# Patient Record
Sex: MALE | Race: WHITE | Employment: FULL TIME | URBAN - METROPOLITAN AREA
[De-identification: names, ages, dates, MRNs, and addresses within clinical notes are randomized per-mention and may not be internally consistent; named-entity substitution may affect disease eponyms.]

---

## 2017-07-05 ENCOUNTER — OFFICE VISIT (OUTPATIENT)
Dept: SURGERY | Facility: CLINIC | Age: 32
End: 2017-07-05

## 2017-07-05 VITALS
SYSTOLIC BLOOD PRESSURE: 105 MMHG | HEART RATE: 56 BPM | WEIGHT: 153.63 LBS | HEIGHT: 70 IN | TEMPERATURE: 97 F | RESPIRATION RATE: 12 BRPM | DIASTOLIC BLOOD PRESSURE: 67 MMHG | BODY MASS INDEX: 22 KG/M2

## 2017-07-05 DIAGNOSIS — K62.89 ANAL PAIN: Primary | ICD-10-CM

## 2017-07-05 PROCEDURE — 46600 DIAGNOSTIC ANOSCOPY SPX: CPT | Performed by: SURGERY

## 2017-07-05 PROCEDURE — 99243 OFF/OP CNSLTJ NEW/EST LOW 30: CPT | Performed by: SURGERY

## 2017-07-05 RX ORDER — ALUMINUM CHLORIDE 20 %
SOLUTION, NON-ORAL TOPICAL
COMMUNITY
Start: 2017-05-08 | End: 2017-07-05

## 2017-07-05 RX ORDER — CLINDAMYCIN PHOSPHATE AND BENZOYL PEROXIDE 10; 50 MG/G; MG/G
GEL TOPICAL
COMMUNITY
Start: 2017-05-11 | End: 2017-07-05

## 2017-07-05 RX ORDER — CIPROFLOXACIN 500 MG/1
TABLET, FILM COATED ORAL
COMMUNITY
Start: 2017-06-24 | End: 2017-07-05

## 2017-07-05 RX ORDER — XERAC AC 0.06 G/G
LIQUID TOPICAL
Refills: 11 | COMMUNITY
Start: 2017-05-11 | End: 2017-07-05

## 2017-07-05 RX ORDER — ESCITALOPRAM OXALATE 10 MG/1
TABLET ORAL
COMMUNITY
Start: 2017-05-05 | End: 2017-07-05

## 2017-07-05 NOTE — H&P
New Patient Visit Note       Active Problems      1. Anal pain        Chief Complaint   Patient presents with:  Anal / Rectal Problem: NP c/o anal skin tag, has some itching. Does not have pain- more bothersome.  Saw Dr. Rubina Torres in 2014 for internal/ e by me today. History reviewed. No pertinent family history.   Social History    Marital status: Single              Spouse name:                       Years of education:                 Number of children:               Social History Main Topics    Smo Abscess  No Fistula in ano  No Anterior Fissure  No Posterior Fissure  No Pilonidal Cyst    See Procedures:  Anoscopy reveals no abnormalities of the anal rectal canal within the view of the anoscope. Musculoskeletal: Normal range of motion.    Neurologic

## 2018-02-12 PROBLEM — Z13.1 SCREENING FOR DIABETES MELLITUS: Status: ACTIVE | Noted: 2018-02-12

## 2018-02-12 PROBLEM — G47.00 INSOMNIA, UNSPECIFIED TYPE: Status: ACTIVE | Noted: 2018-02-12

## 2018-02-12 PROBLEM — Z13.220 SCREENING CHOLESTEROL LEVEL: Status: ACTIVE | Noted: 2018-02-12

## 2018-02-12 PROBLEM — Z00.00 PREVENTATIVE HEALTH CARE: Status: ACTIVE | Noted: 2018-02-12

## 2018-02-12 PROBLEM — K64.9 HEMORRHOIDS, UNSPECIFIED HEMORRHOID TYPE: Status: ACTIVE | Noted: 2018-02-12

## 2018-02-12 PROBLEM — F41.9 ANXIETY: Status: ACTIVE | Noted: 2018-02-12

## 2018-02-20 ENCOUNTER — OFFICE VISIT (OUTPATIENT)
Dept: SURGERY | Facility: CLINIC | Age: 33
End: 2018-02-20

## 2018-02-20 VITALS
WEIGHT: 154 LBS | BODY MASS INDEX: 22.05 KG/M2 | TEMPERATURE: 98 F | DIASTOLIC BLOOD PRESSURE: 85 MMHG | HEART RATE: 52 BPM | HEIGHT: 70 IN | SYSTOLIC BLOOD PRESSURE: 137 MMHG

## 2018-02-20 DIAGNOSIS — K64.8 INTERNAL HEMORRHOIDS: Primary | ICD-10-CM

## 2018-02-20 DIAGNOSIS — K62.89 ANAL PAIN: ICD-10-CM

## 2018-02-20 DIAGNOSIS — Z98.890 HISTORY OF LUMBOSACRAL SPINE SURGERY: ICD-10-CM

## 2018-02-20 PROCEDURE — 46600 DIAGNOSTIC ANOSCOPY SPX: CPT | Performed by: COLON & RECTAL SURGERY

## 2018-02-20 PROCEDURE — 99243 OFF/OP CNSLTJ NEW/EST LOW 30: CPT | Performed by: COLON & RECTAL SURGERY

## 2018-02-20 NOTE — PATIENT INSTRUCTIONS
This patient presents for evaluation and treatment of anal pain. The patient was last seen in 2014 when he underwent excisional hemorrhoidectomy in August.  He did have a prolonged recovery period. It took him approximately 90 days to be symptom-free. alert, no significant distress. His lungs are clear to auscultation bilaterally. His heart rate and rhythm are regular. His abdomen is soft, nontender, nondistended, with normoactive bowel sounds.   External examination of the perineum reveals 2 slash in

## 2018-02-20 NOTE — H&P
New Patient Visit Note       Active Problems      1. Internal hemorrhoids    2. Anal pain    3.  History of lumbosacral spine surgery - pudendal nerve decompression and transposition        Chief Complaint   Anal pain      History of Present Illness   This past history consists of a pudendal nerve decompression and transposition. This was due to severe coccygeal pain that he experienced after a fall which was refractory to medical management.   The patient denies any pain or complications related to this ope sister or daughter with cancer of the uterus? no   Do you have any grandparents, grandchildren, aunts, nieces, or first cousins with cancer of the uterus? no     Are you taking aspirin? no   Do you take any blood thinners?  no     Are you taking prednisone Outpatient Prescriptions:  Hydrocortisone Acetate 25 MG Rectal Suppos Place 1 suppository (25 mg total) rectally 2 (two) times daily.  Disp: 24 suppository Rfl: 5   diazepam 5 MG Oral Tab Take 1 tablet (5 mg total) by mouth every 12 (twelve) hours as needed and normal heart sounds. No murmur heard. Pulmonary/Chest: No accessory muscle usage. No tachypnea. No respiratory distress. He has no decreased breath sounds. He has no wheezes. He has no rhonchi. He has no rales. Abdominal: Soft.  Normal appearance cervical and no posterior cervical adenopathy present. Right: No inguinal and no supraclavicular adenopathy present. Left: No inguinal and no supraclavicular adenopathy present.    Neurological: He is alert and oriented to person, place, and t a Hydrocort suppository prescription. He states that this has provided minimal aid. He is also currently performing sitz baths. The patient has never had a colonoscopy. Physical exam:  The patient is awake, alert, no significant distress.   His lungs

## 2018-02-22 ENCOUNTER — OFFICE VISIT (OUTPATIENT)
Dept: SURGERY | Facility: CLINIC | Age: 33
End: 2018-02-22

## 2018-02-22 VITALS
BODY MASS INDEX: 22.05 KG/M2 | HEART RATE: 65 BPM | SYSTOLIC BLOOD PRESSURE: 132 MMHG | DIASTOLIC BLOOD PRESSURE: 76 MMHG | HEIGHT: 70 IN | WEIGHT: 154 LBS

## 2018-02-22 DIAGNOSIS — K64.2 PROLAPSED INTERNAL HEMORRHOIDS, GRADE 3: Primary | ICD-10-CM

## 2018-02-22 DIAGNOSIS — K62.89 ANAL PAIN: ICD-10-CM

## 2018-02-22 PROCEDURE — 46221 LIGATION OF HEMORRHOID(S): CPT | Performed by: COLON & RECTAL SURGERY

## 2018-02-22 NOTE — PROGRESS NOTES
Follow Up Visit Note       Active Problems      1. Prolapsed internal hemorrhoids, grade 3    2.  Anal pain          Chief Complaint   Patient presents with:  Anal / Rectal Problem: 1ST BANDING        History of Present Illness        Allergies  Aren Olmstead has N weight change. HENT: Negative for hearing loss, nosebleeds, sore throat and trouble swallowing. Respiratory: Negative for apnea, cough, shortness of breath and wheezing. Cardiovascular: Negative for chest pain, palpitations and leg swelling.    Pricila

## 2018-02-23 PROBLEM — K64.2 PROLAPSED INTERNAL HEMORRHOIDS, GRADE 3: Status: ACTIVE | Noted: 2018-02-12

## 2018-02-23 NOTE — PATIENT INSTRUCTIONS
At today's office visit we treated a right anterolateral internal hemorrhoid. At today's visit, the patient underwent an uncomplicated application of a rubber band and injection of a 5% phenol solution into the base of the rubberbanded hemorrhoid.     Sravan Ferreira

## 2018-02-23 NOTE — PROCEDURES
Pre op diagnosis: Internal Hemorrhoids    Post op diagnosis: Same    Procedure: Anoscopy with O-ring Rubber Band Ligation of Internal Hemorrhoids    Surgeon: Dillon Ledesma MD    History of present illness:    This patient has internal hemorrhoids that are s

## 2018-03-08 ENCOUNTER — OFFICE VISIT (OUTPATIENT)
Dept: SURGERY | Facility: CLINIC | Age: 33
End: 2018-03-08

## 2018-03-08 VITALS
BODY MASS INDEX: 22.81 KG/M2 | TEMPERATURE: 98 F | WEIGHT: 154 LBS | HEIGHT: 69 IN | HEART RATE: 50 BPM | DIASTOLIC BLOOD PRESSURE: 81 MMHG | SYSTOLIC BLOOD PRESSURE: 127 MMHG

## 2018-03-08 DIAGNOSIS — K64.2 PROLAPSED INTERNAL HEMORRHOIDS, GRADE 3: Primary | ICD-10-CM

## 2018-03-08 PROCEDURE — 46221 LIGATION OF HEMORRHOID(S): CPT | Performed by: COLON & RECTAL SURGERY

## 2018-03-08 NOTE — PROGRESS NOTES
Follow Up Visit Note       Active Problems      1. Prolapsed internal hemorrhoids, grade 3          Chief Complaint   Patient presents with:  Anal / Rectal Problem: 2ND BANDING. HAD A LITTLE BLOOD THE DAY AFTER AND A LITTLE BLOOD LAST NIGHT.          Histor chills, diaphoresis, fatigue, fever and unexpected weight change. HENT: Negative for hearing loss, nosebleeds, sore throat and trouble swallowing. Respiratory: Negative for apnea, cough, shortness of breath and wheezing.     Cardiovascular: Negative fo

## 2018-03-14 ENCOUNTER — TELEPHONE (OUTPATIENT)
Dept: SURGERY | Facility: CLINIC | Age: 33
End: 2018-03-14

## 2018-03-14 NOTE — TELEPHONE ENCOUNTER
Patient called report that since his banding done on 3/8/2018 he has had increased pain and discomfort 5/10 to the site of the first banding site, patient denies discharge, bleeding or fever.  He has a 3 rd banding scheduled for 3/22/2018 and will call the

## 2018-03-15 NOTE — PROCEDURES
Pre op diagnosis: Internal Hemorrhoids    Post op diagnosis: Same    Procedure: Anoscopy with O-ring Rubber Band Ligation of Internal Hemorrhoids    Surgeon: Marlene Márquez MD    History of present illness:    This patient has internal hemorrhoids that are s

## 2018-03-22 ENCOUNTER — OFFICE VISIT (OUTPATIENT)
Dept: SURGERY | Facility: CLINIC | Age: 33
End: 2018-03-22

## 2018-03-22 VITALS
SYSTOLIC BLOOD PRESSURE: 116 MMHG | TEMPERATURE: 99 F | DIASTOLIC BLOOD PRESSURE: 68 MMHG | HEIGHT: 70 IN | BODY MASS INDEX: 20.76 KG/M2 | WEIGHT: 145 LBS | HEART RATE: 52 BPM

## 2018-03-22 DIAGNOSIS — K64.2 PROLAPSED INTERNAL HEMORRHOIDS, GRADE 3: Primary | ICD-10-CM

## 2018-03-22 PROCEDURE — 46221 LIGATION OF HEMORRHOID(S): CPT | Performed by: COLON & RECTAL SURGERY

## 2018-03-22 NOTE — PATIENT INSTRUCTIONS
At today's office visit we treated a right lateral internal hemorrhoid. At today's visit, the patient underwent an uncomplicated application of a rubber band and injection of a 5% phenol solution into the base of the rubberbanded hemorrhoid.     The maddie

## 2018-03-22 NOTE — PROGRESS NOTES
Follow Up Visit Note       Active Problems      1. Prolapsed internal hemorrhoids, grade 3          Chief Complaint   Patient presents with:  Hemorrhoids: 3rd hemorrhoid banding. C/O anal pain/soreness.          History of Present Illness        Allergies change. HENT: Negative for hearing loss, nosebleeds, sore throat and trouble swallowing. Respiratory: Negative for apnea, cough, shortness of breath and wheezing. Cardiovascular: Negative for chest pain, palpitations and leg swelling.    Gastrointes

## 2018-03-22 NOTE — PROCEDURES
Pre op diagnosis: Internal Hemorrhoids    Post op diagnosis: Same    Procedure: Anoscopy with O-ring Rubber Band Ligation of Internal Hemorrhoids    Surgeon: Christine Sauceda MD    History of present illness:    This patient has internal hemorrhoids that are s

## 2018-04-17 ENCOUNTER — OFFICE VISIT (OUTPATIENT)
Dept: SURGERY | Facility: CLINIC | Age: 33
End: 2018-04-17

## 2018-04-17 VITALS
DIASTOLIC BLOOD PRESSURE: 73 MMHG | SYSTOLIC BLOOD PRESSURE: 112 MMHG | HEIGHT: 70 IN | TEMPERATURE: 98 F | WEIGHT: 145 LBS | HEART RATE: 60 BPM | BODY MASS INDEX: 20.76 KG/M2

## 2018-04-17 DIAGNOSIS — K62.89 ANAL PAIN: Primary | ICD-10-CM

## 2018-04-17 PROCEDURE — 46600 DIAGNOSTIC ANOSCOPY SPX: CPT | Performed by: COLON & RECTAL SURGERY

## 2018-04-18 NOTE — PROCEDURES
Procedure:  Anoscopy    Surgeon: Jimmie Beth    Anesthesia: None    Findings: See the progress note attached for all findings    Operative Summary: The patient was placed in a prone position on the proctoscopy table, the hips were flexed in the jackknife p

## 2018-04-18 NOTE — PROGRESS NOTES
Follow Up Visit Note       Active Problems      1. Anal pain          Chief Complaint   Patient presents with:  Anal / Rectal Problem: 4TH BANDING. PT HAD A LITTLE BIT OF BLEEDING A FEW DAYS AGO WITH BOWEL MOVEMENTS.          History of Present Illness fatigue, fever and unexpected weight change. HENT: Negative for hearing loss, nosebleeds, sore throat and trouble swallowing. Respiratory: Negative for apnea, cough, shortness of breath and wheezing.     Cardiovascular: Negative for chest pain, palpita MD

## 2018-04-18 NOTE — PATIENT INSTRUCTIONS
This patient has completed 3 rubber band treatments for recurrent internal hemorrhoids, grade 3. I am happy to report that all of his symptoms have completely resolved. Anoscopy was performed today.   This reveals no evidence of recurrent or residual

## 2018-06-15 PROBLEM — R36.1 BLOOD IN SEMEN: Status: RESOLVED | Noted: 2017-08-18 | Resolved: 2018-06-15

## 2018-06-15 PROBLEM — R05.9 COUGH: Status: ACTIVE | Noted: 2017-03-07

## 2018-06-15 PROBLEM — G43.909 MIGRAINES: Status: ACTIVE | Noted: 2018-05-18

## 2018-06-15 PROBLEM — L50.9 URTICARIA: Status: RESOLVED | Noted: 2017-04-17 | Resolved: 2018-06-15

## 2018-06-15 PROBLEM — R36.1 BLOOD IN SEMEN: Status: ACTIVE | Noted: 2017-08-18

## 2018-06-15 PROBLEM — Z71.84 TRAVEL ADVICE ENCOUNTER: Status: RESOLVED | Noted: 2018-03-12 | Resolved: 2018-06-15

## 2018-06-15 PROBLEM — S22.39XA RIB FRACTURE: Status: ACTIVE | Noted: 2017-02-27

## 2018-06-15 PROBLEM — G47.00 INSOMNIA: Status: ACTIVE | Noted: 2017-01-14

## 2018-06-15 PROBLEM — S22.39XA RIB FRACTURE: Status: RESOLVED | Noted: 2017-02-27 | Resolved: 2018-06-15

## 2018-06-15 PROBLEM — R05.9 COUGH: Status: RESOLVED | Noted: 2017-03-07 | Resolved: 2018-06-15

## 2018-06-15 PROBLEM — L50.9 URTICARIA: Status: ACTIVE | Noted: 2017-04-17

## 2018-06-15 PROBLEM — Z71.84 TRAVEL ADVICE ENCOUNTER: Status: ACTIVE | Noted: 2018-03-12

## 2018-06-15 PROCEDURE — 81003 URINALYSIS AUTO W/O SCOPE: CPT | Performed by: INTERNAL MEDICINE

## 2018-08-06 ENCOUNTER — OFFICE VISIT (OUTPATIENT)
Dept: SURGERY | Facility: CLINIC | Age: 33
End: 2018-08-06
Payer: COMMERCIAL

## 2018-08-06 VITALS
BODY MASS INDEX: 20.76 KG/M2 | HEART RATE: 64 BPM | HEIGHT: 70 IN | SYSTOLIC BLOOD PRESSURE: 132 MMHG | WEIGHT: 145 LBS | DIASTOLIC BLOOD PRESSURE: 77 MMHG

## 2018-08-06 DIAGNOSIS — K64.2 PROLAPSED INTERNAL HEMORRHOIDS, GRADE 3: Primary | ICD-10-CM

## 2018-08-06 PROCEDURE — 46221 LIGATION OF HEMORRHOID(S): CPT | Performed by: COLON & RECTAL SURGERY

## 2018-08-06 NOTE — PATIENT INSTRUCTIONS
This patient presents with what he feels is a recurrence of his internal hemorrhoids. He was recently [place] in [place] [place], he felt some pain and swelling in the anus.     Clinical exam today reveals a right posterior lateral internal hemorrhoid at th

## 2018-08-06 NOTE — PROGRESS NOTES
Follow Up Visit Note       Active Problems      1. Prolapsed internal hemorrhoids, grade 3          Chief Complaint   Patient presents with:  Hemorrhoids: Continued care of hemorrhoids c/o rectal pain right side and rectal bleeding.  Hx of hemorrhoid bandin Acetaminophen 500 MG Oral Cap Take 1 capsule by mouth every 6 (six) hours as needed for Fever. Disp: 100 capsule Rfl: 0        Review of Systems  The Review of Systems has been reviewed by me during today.   Review of Systems   Constitutional: Negative fo asked to recover in my office for a few minutes prior to leaving for home. The patient should refrain from extreme sports or athletic activity, including heavy lifting. I have no further follow-up scheduled with this patient at this time.   This patie

## 2018-08-06 NOTE — PROCEDURES
Pre op diagnosis: Internal Hemorrhoids    Post op diagnosis: Same    Procedure: Anoscopy with O-ring Rubber Band Ligation of Internal Hemorrhoids    Surgeon: Sherrie Baker MD    History of present illness:    This patient has internal hemorrhoids that are s

## 2018-08-23 ENCOUNTER — OFFICE VISIT (OUTPATIENT)
Dept: SURGERY | Facility: CLINIC | Age: 33
End: 2018-08-23
Payer: COMMERCIAL

## 2018-08-23 VITALS
SYSTOLIC BLOOD PRESSURE: 115 MMHG | HEIGHT: 70 IN | RESPIRATION RATE: 20 BRPM | WEIGHT: 145 LBS | HEART RATE: 82 BPM | BODY MASS INDEX: 20.76 KG/M2 | DIASTOLIC BLOOD PRESSURE: 78 MMHG

## 2018-08-23 DIAGNOSIS — K64.2 PROLAPSED INTERNAL HEMORRHOIDS, GRADE 3: Primary | ICD-10-CM

## 2018-08-23 PROCEDURE — 46221 LIGATION OF HEMORRHOID(S): CPT | Performed by: COLON & RECTAL SURGERY

## 2018-08-23 NOTE — PROGRESS NOTES
Follow Up Visit Note       Active Problems      1.  Prolapsed internal hemorrhoids, grade 3          Chief Complaint   Patient presents with:  Hemorrhoids: established patient, possible banding of hemorrhoid, was banded 2 weeks ago wants to insure prpperla trevizo daily. Disp:  Rfl:         Review of Systems  The Review of Systems has been reviewed by me during today. Review of Systems   Constitutional: Negative for chills, diaphoresis, fatigue, fever and unexpected weight change.    HENT: Negative for hearing loss, urgently for any problems or complications related to my surgical intervention. No orders of the defined types were placed in this encounter. Imaging & Referrals   None    Follow Up  Return if symptoms worsen or fail to improve.     Ada Mckee

## 2018-08-23 NOTE — PROCEDURES
Pre op diagnosis: Internal Hemorrhoids    Post op diagnosis: Same    Procedure: Anoscopy with O-ring Rubber Band Ligation of Internal Hemorrhoids    Surgeon: Kassie Graham MD    History of present illness:    This patient has internal hemorrhoids that are s

## 2018-10-18 ENCOUNTER — OFFICE VISIT (OUTPATIENT)
Dept: SURGERY | Facility: CLINIC | Age: 33
End: 2018-10-18
Payer: COMMERCIAL

## 2018-10-18 VITALS
SYSTOLIC BLOOD PRESSURE: 145 MMHG | DIASTOLIC BLOOD PRESSURE: 82 MMHG | WEIGHT: 145 LBS | HEART RATE: 61 BPM | HEIGHT: 70 IN | BODY MASS INDEX: 20.76 KG/M2

## 2018-10-18 DIAGNOSIS — Z98.890 HISTORY OF LUMBOSACRAL SPINE SURGERY: ICD-10-CM

## 2018-10-18 DIAGNOSIS — K62.89 ANAL PAIN: Primary | ICD-10-CM

## 2018-10-18 PROCEDURE — 46600 DIAGNOSTIC ANOSCOPY SPX: CPT | Performed by: COLON & RECTAL SURGERY

## 2018-10-18 PROCEDURE — 99213 OFFICE O/P EST LOW 20 MIN: CPT | Performed by: COLON & RECTAL SURGERY

## 2018-10-18 NOTE — PROGRESS NOTES
Follow Up Visit Note       Active Problems      1. Anal pain    2. History of lumbosacral spine surgery - pudendal nerve decompression and transposition          Chief Complaint   Patient presents with:  Anal / Rectal Problem: EST PT - HEMORRHOIDS.  PT STAT Not on file      Number of children: Not on file      Years of education: Not on file      Highest education level: Not on file    Social Needs      Financial resource strain: Not on file      Food insecurity - worry: Not on file      Food insecurity - daksha diarrhea, nausea and vomiting. Genitourinary: Negative for difficulty urinating, dysuria, frequency and urgency. Musculoskeletal: Negative for arthralgias and myalgias. Skin: Negative for color change and rash.    Neurological: Negative for tremors, s within the last few days since he made this appointment. He has required multiple rubber band treatments to the redundant anal mucosa and is grade 3 prolapsing internal hemorrhoids.     Clinical exam today including a anoscopy reveals him to have no curr

## 2018-10-20 PROBLEM — G47.00 INSOMNIA: Status: RESOLVED | Noted: 2017-01-14 | Resolved: 2018-10-20

## 2018-10-20 PROBLEM — K64.2 PROLAPSED INTERNAL HEMORRHOIDS, GRADE 3: Status: RESOLVED | Noted: 2018-02-12 | Resolved: 2018-10-20

## 2018-10-20 PROBLEM — Z13.1 SCREENING FOR DIABETES MELLITUS: Status: RESOLVED | Noted: 2018-02-12 | Resolved: 2018-10-20

## 2018-10-20 PROBLEM — Z13.220 SCREENING CHOLESTEROL LEVEL: Status: RESOLVED | Noted: 2018-02-12 | Resolved: 2018-10-20

## 2018-10-20 PROBLEM — Z00.00 PREVENTATIVE HEALTH CARE: Status: RESOLVED | Noted: 2018-02-12 | Resolved: 2018-10-20

## 2018-10-20 NOTE — PATIENT INSTRUCTIONS
I am seeing this patient for anal pain and burning. He has had several procedures on the sacrum, and anus. We have been rubber banding hemorrhoids. The patient states that he had initial very good relief with the hemorrhoid rubber banding.   Unfortun

## 2018-10-20 NOTE — PROCEDURES
Procedure:  Anoscopy    Surgeon: Army Paez    Anesthesia: None    Findings: See the progress note attached for all findings    Operative Summary: The patient was placed in a prone position on the proctoscopy table, the hips were flexed in the jackknife p

## 2018-12-14 PROBLEM — G43.109 MIGRAINE WITH AURA AND WITHOUT STATUS MIGRAINOSUS, NOT INTRACTABLE: Status: ACTIVE | Noted: 2018-12-14

## 2018-12-14 PROBLEM — R45.86 EMOTIONAL LABILITY: Status: ACTIVE | Noted: 2018-12-14

## 2018-12-14 PROBLEM — N50.819 TESTICULAR DISCOMFORT: Status: ACTIVE | Noted: 2018-08-29

## 2018-12-14 PROBLEM — F81.9 LEARNING DIFFICULTY: Status: ACTIVE | Noted: 2018-09-25

## 2019-02-22 PROBLEM — L55.0 SUNBURN OF FIRST DEGREE: Status: ACTIVE | Noted: 2019-02-22

## 2019-03-21 ENCOUNTER — OFFICE VISIT (OUTPATIENT)
Dept: SURGERY | Facility: CLINIC | Age: 34
End: 2019-03-21

## 2019-03-21 VITALS
DIASTOLIC BLOOD PRESSURE: 80 MMHG | SYSTOLIC BLOOD PRESSURE: 130 MMHG | BODY MASS INDEX: 22 KG/M2 | WEIGHT: 154 LBS | TEMPERATURE: 98 F | HEART RATE: 66 BPM

## 2019-03-21 DIAGNOSIS — K62.89 ANAL PAIN: Primary | ICD-10-CM

## 2019-03-21 DIAGNOSIS — K64.2 GRADE III HEMORRHOIDS: ICD-10-CM

## 2019-03-21 PROCEDURE — 99213 OFFICE O/P EST LOW 20 MIN: CPT | Performed by: COLON & RECTAL SURGERY

## 2019-03-21 PROCEDURE — 46600 DIAGNOSTIC ANOSCOPY SPX: CPT | Performed by: COLON & RECTAL SURGERY

## 2019-03-21 NOTE — PATIENT INSTRUCTIONS
This patient presents for evaluation and treatment of anal burning and discomfort. The patient was recently seen 1 year ago and found to have grade 3 internal hemorrhoids. He underwent rubber band treatments with good relief.   He did have a mild flare contact our office with any questions or concerns.

## 2019-03-21 NOTE — PROGRESS NOTES
Follow Up Visit Note       Active Problems      1. Anal pain    2.  Grade III hemorrhoids          Chief Complaint   Patient presents with:  Hemorrhoids: pt states had 4 bandings done last yr with Dr. Maximilian Martini Problem (GI): pt c/o of itchiness/burning f listed diagnoses and treatment options. Allergies  Nayana Franks has No Known Allergies. Past Medical / Surgical / Social / Family History    The past medical and past surgical history have been reviewed by me today.     Past Medical History:   Diagnosis diaphoresis, fatigue, fever and unexpected weight change. HENT: Negative for hearing loss, nosebleeds, sore throat and trouble swallowing. Respiratory: Negative for apnea, cough, shortness of breath and wheezing.     Cardiovascular: Negative for chest Prostate Nodule  Anal Sphincter Intact  No Pruritis Ani  No Lichenification  No Abscess  No Fistula in ano  No Anterior Fissure  No Posterior Fissure  No Pilonidal Cyst     Neurological: He is alert and oriented to person, place, and time.    Skin: Skin is which would benefit from a rubber band at this time. 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 I have encouraged him to obtain Anusol hydrocortisone suppositories which should be available over-the-counter.   The patient may use

## 2019-10-10 ENCOUNTER — OFFICE VISIT (OUTPATIENT)
Dept: SURGERY | Facility: CLINIC | Age: 34
End: 2019-10-10
Payer: COMMERCIAL

## 2019-10-10 VITALS
HEART RATE: 92 BPM | DIASTOLIC BLOOD PRESSURE: 72 MMHG | BODY MASS INDEX: 20.76 KG/M2 | SYSTOLIC BLOOD PRESSURE: 114 MMHG | HEIGHT: 70 IN | TEMPERATURE: 98 F | WEIGHT: 145 LBS

## 2019-10-10 DIAGNOSIS — K92.2 INTESTINAL BLEEDING: Primary | ICD-10-CM

## 2019-10-10 DIAGNOSIS — K62.89 ANAL PAIN: ICD-10-CM

## 2019-10-10 DIAGNOSIS — K64.2 GRADE III HEMORRHOIDS: ICD-10-CM

## 2019-10-10 PROCEDURE — 46221 LIGATION OF HEMORRHOID(S): CPT | Performed by: COLON & RECTAL SURGERY

## 2019-10-10 PROCEDURE — 99213 OFFICE O/P EST LOW 20 MIN: CPT | Performed by: COLON & RECTAL SURGERY

## 2019-10-10 NOTE — PROGRESS NOTES
Follow Up Visit Note       Active Problems      1. Intestinal bleeding    2. Grade III hemorrhoids    3.  Anal pain          Chief Complaint   Patient presents with:  Anal Problem (GI): est patient rectal pain when sitting, bleeding with BM x 3 weeks, rash Highest education level: Not on file    Tobacco Use      Smoking status: Never Smoker      Smokeless tobacco: Never Used    Substance and Sexual Activity      Alcohol use: Yes        Comment: 2-3x/month      Drug use: No       Current Outpatient Medication No rectal mass, anal fissure, tenderness, external hemorrhoid or abnormal anal tone. Prostate is not enlarged and not tender.     Genitourinary Comments: No Prostate Nodule  Anal Sphincter Intact  No Pruritis Ani  No Lichenification  No Abscess  No Fist fissures or fistulae. There is no abscess present. Prostate is normal.  The mucosal surfaces are smooth, however he does have a right lateral large internal hemorrhoid grade 3.   I took the opportunity today to rubber band and treat this hemorrhoid includ

## 2019-10-13 PROBLEM — K92.2 INTESTINAL BLEEDING: Status: ACTIVE | Noted: 2019-10-13

## 2019-10-13 NOTE — PATIENT INSTRUCTIONS
This patient presents for further consultation regarding anal pain and bleeding. We have rubberbanded several internal hemorrhoids, grade 3. The patient states that on September 2, 2019, he was seen for some perianal pain and itching.   He was started

## 2019-11-19 ENCOUNTER — OFFICE VISIT (OUTPATIENT)
Dept: SURGERY | Facility: CLINIC | Age: 34
End: 2019-11-19
Payer: COMMERCIAL

## 2019-11-19 VITALS
DIASTOLIC BLOOD PRESSURE: 76 MMHG | WEIGHT: 150 LBS | RESPIRATION RATE: 18 BRPM | BODY MASS INDEX: 21.47 KG/M2 | HEIGHT: 70 IN | TEMPERATURE: 98 F | HEART RATE: 80 BPM | SYSTOLIC BLOOD PRESSURE: 116 MMHG

## 2019-11-19 DIAGNOSIS — K92.2 INTESTINAL BLEEDING: ICD-10-CM

## 2019-11-19 DIAGNOSIS — K62.89 ANAL PAIN: Primary | ICD-10-CM

## 2019-11-19 DIAGNOSIS — K64.2 GRADE III HEMORRHOIDS: ICD-10-CM

## 2019-11-19 PROCEDURE — 99212 OFFICE O/P EST SF 10 MIN: CPT | Performed by: COLON & RECTAL SURGERY

## 2019-11-19 PROCEDURE — 46221 LIGATION OF HEMORRHOID(S): CPT | Performed by: COLON & RECTAL SURGERY

## 2019-11-19 NOTE — PATIENT INSTRUCTIONS
I am seeing this patient in further consultation regarding anal pain, prolapse, and bleeding. This patient had excisional hemorrhoidectomy in 2014. He has subsequently had multiple rubber band treatments to the region.   He also has had simple injection

## 2019-11-19 NOTE — PROCEDURES
Pre op diagnosis: Internal Hemorrhoids    Post op diagnosis: Same    Procedure: Anoscopy with O-ring Rubber Band Ligation of Internal Hemorrhoids    Surgeon: Giselle Villegas MD    History of present illness:    This patient has internal hemorrhoids that are s

## 2019-11-19 NOTE — PROGRESS NOTES
Follow Up Visit Note       Active Problems      1. Anal pain    2. Intestinal bleeding    3.  Grade III hemorrhoids          Chief Complaint   Patient presents with:  Hemorrhoids: est pt-cont care rectal bleeding-possible 2nd banding        History of Prese Mother      Social History    Socioeconomic History      Marital status: Single      Spouse name: Not on file      Number of children: Not on file      Years of education: Not on file      Highest education level: Not on file    Tobacco Use      Smoking st bruise/bleed easily. Psychiatric/Behavioral: Negative for behavioral problems and sleep disturbance.         Physical Findings   /76 (BP Location: Right arm, Patient Position: Sitting, Cuff Size: adult)   Pulse 80   Temp 97.7 °F (36.5 °C)   Resp 18 bleeding in the posterior midline region. He states he has been seeing less blood since we have performed further rubber band treatments.     Clinical exam including anoscopy reveals a posterior midline prolapsing hemorrhoid that is originating above pre

## 2020-01-09 ENCOUNTER — OFFICE VISIT (OUTPATIENT)
Dept: SURGERY | Facility: CLINIC | Age: 35
End: 2020-01-09
Payer: COMMERCIAL

## 2020-01-09 VITALS — SYSTOLIC BLOOD PRESSURE: 126 MMHG | DIASTOLIC BLOOD PRESSURE: 84 MMHG | HEART RATE: 67 BPM | TEMPERATURE: 98 F

## 2020-01-09 DIAGNOSIS — K62.89 ANAL PAIN: ICD-10-CM

## 2020-01-09 DIAGNOSIS — K64.5 THROMBOSED EXTERNAL HEMORRHOIDS: Primary | ICD-10-CM

## 2020-01-09 PROBLEM — R05.9 COUGH: Status: ACTIVE | Noted: 2020-01-09

## 2020-01-09 PROBLEM — J01.00 ACUTE NON-RECURRENT MAXILLARY SINUSITIS: Status: ACTIVE | Noted: 2020-01-09

## 2020-01-09 PROBLEM — N50.819 TESTICULAR DISCOMFORT: Status: RESOLVED | Noted: 2018-08-29 | Resolved: 2020-01-09

## 2020-01-09 PROCEDURE — 99213 OFFICE O/P EST LOW 20 MIN: CPT | Performed by: COLON & RECTAL SURGERY

## 2020-01-09 PROCEDURE — 46600 DIAGNOSTIC ANOSCOPY SPX: CPT | Performed by: COLON & RECTAL SURGERY

## 2020-01-09 NOTE — PROGRESS NOTES
Follow Up Visit Note       Active Problems      1. Thrombosed external hemorrhoids    2. Anal pain          Chief Complaint   Patient presents with:  Hemorrhoids: Est Pt; Bleeding with BM, started a few days ago.  Reports feeling an external hemorrhoid, cau was 30 minutes. Greater than half of our visit was spent in counseling the patient on the above listed diagnoses and treatment options. Allergies  Ariel Thomas has No Known Allergies.     Past Medical / Surgical / Social / Family History    The past medical 1, then 1 tab on days 2.,3,4,5, Disp: 6 tablet, Rfl: 0  •  dextromethorphan-guaiFENesin  MG/5ML Oral Liquid, Take 5 mL by mouth every 12 (twelve) hours. , Disp: 237 mL, Rfl: 0  •  Sertraline HCl 50 MG Oral Tab, Take 3 tablets (150 mg total) by mouth d tachypnea. No respiratory distress. He has no decreased breath sounds. He has no wheezes. He has no rhonchi. He has no rales. Abdominal: Soft.  Normal appearance and bowel sounds are normal. He exhibits no distension, no fluid wave, no ascites, no pulsati new hemorrhoid. The patient has underwent an excisional hemorrhoidectomy by myself in 2014. He has had multiple rubber band treatments for internal hemorrhoids the most recent treatment in November 2019.     The patient states that approximately 1 month None    Follow Up  Return if symptoms worsen or fail to improve.     Bonny Pena MD

## 2020-01-09 NOTE — PATIENT INSTRUCTIONS
The patient is well-known to myself with a history of hemorrhoids, and presents today feeling that he has a new hemorrhoid. The patient has underwent an excisional hemorrhoidectomy by myself in 2014.   He has had multiple rubber band treatments for inter for evaluation.

## 2020-04-01 PROBLEM — Z13.228 SCREENING FOR METABOLIC DISORDER: Status: ACTIVE | Noted: 2020-04-01

## 2020-04-01 PROBLEM — R05.9 COUGH: Status: RESOLVED | Noted: 2020-01-09 | Resolved: 2020-04-01

## 2020-04-01 PROBLEM — Z00.00 ANNUAL PHYSICAL EXAM: Status: ACTIVE | Noted: 2020-04-01

## 2020-04-01 PROBLEM — L55.0 SUNBURN OF FIRST DEGREE: Status: RESOLVED | Noted: 2019-02-22 | Resolved: 2020-04-01

## 2020-04-01 PROBLEM — J01.00 ACUTE NON-RECURRENT MAXILLARY SINUSITIS: Status: RESOLVED | Noted: 2020-01-09 | Resolved: 2020-04-01

## 2020-06-16 PROBLEM — Z11.9 SCREENING EXAMINATION FOR INFECTIOUS DISEASE: Status: ACTIVE | Noted: 2020-06-16

## 2020-08-11 ENCOUNTER — OFFICE VISIT (OUTPATIENT)
Dept: SURGERY | Facility: CLINIC | Age: 35
End: 2020-08-11
Payer: COMMERCIAL

## 2020-08-11 VITALS — TEMPERATURE: 98 F | HEART RATE: 64 BPM | SYSTOLIC BLOOD PRESSURE: 132 MMHG | DIASTOLIC BLOOD PRESSURE: 82 MMHG

## 2020-08-11 DIAGNOSIS — K64.2 GRADE III HEMORRHOIDS: ICD-10-CM

## 2020-08-11 DIAGNOSIS — K62.89 ANAL PAIN: Primary | ICD-10-CM

## 2020-08-11 PROBLEM — K64.5 THROMBOSED EXTERNAL HEMORRHOIDS: Status: RESOLVED | Noted: 2020-01-09 | Resolved: 2020-08-11

## 2020-08-11 PROCEDURE — 3079F DIAST BP 80-89 MM HG: CPT | Performed by: COLON & RECTAL SURGERY

## 2020-08-11 PROCEDURE — 46600 DIAGNOSTIC ANOSCOPY SPX: CPT | Performed by: COLON & RECTAL SURGERY

## 2020-08-11 PROCEDURE — 99214 OFFICE O/P EST MOD 30 MIN: CPT | Performed by: COLON & RECTAL SURGERY

## 2020-08-11 PROCEDURE — 3075F SYST BP GE 130 - 139MM HG: CPT | Performed by: COLON & RECTAL SURGERY

## 2020-08-11 NOTE — PATIENT INSTRUCTIONS
I am seeing this patient in semiurgent consultation regarding acute onset of anal pain. The patient has had a long history of internal hemorrhoids. He has also had perineal pain treated with nerve blocks and nerve stimulation.     He has had surgery on

## 2020-08-11 NOTE — PROGRESS NOTES
Follow Up Visit Note       Active Problems      1. Anal pain    2. Grade III hemorrhoids          Chief Complaint   Patient presents with:  Hemorrhoids: Est patient here for a Hemorrhoid check, believes his external hemorrhoids are back.  Has pain with sitt today.     Family History   Problem Relation Age of Onset   • Hypertension Mother    • Lipids Mother      Social History    Socioeconomic History      Marital status: Single      Spouse name: Not on file      Number of children: Not on file      Years of ed constipation, diarrhea, nausea and vomiting. Genitourinary: Negative for difficulty urinating, dysuria, frequency and urgency. Musculoskeletal: Negative for arthralgias and myalgias. Skin: Negative for color change and rash.    Neurological: Negative long history of internal hemorrhoids. He has also had perineal pain treated with nerve blocks and nerve stimulation.     He has had surgery on the sacral nerves to get rid of anal pain in the past.  I have treated him with rubber band therapy to his hemorr

## 2020-08-13 ENCOUNTER — OFFICE VISIT (OUTPATIENT)
Dept: SURGERY | Facility: CLINIC | Age: 35
End: 2020-08-13
Payer: COMMERCIAL

## 2020-08-13 VITALS
DIASTOLIC BLOOD PRESSURE: 82 MMHG | BODY MASS INDEX: 21.47 KG/M2 | HEART RATE: 64 BPM | TEMPERATURE: 98 F | HEIGHT: 70 IN | WEIGHT: 150 LBS | SYSTOLIC BLOOD PRESSURE: 132 MMHG

## 2020-08-13 DIAGNOSIS — K64.2 GRADE III HEMORRHOIDS: Primary | ICD-10-CM

## 2020-08-13 PROCEDURE — 3075F SYST BP GE 130 - 139MM HG: CPT | Performed by: COLON & RECTAL SURGERY

## 2020-08-13 PROCEDURE — 46221 LIGATION OF HEMORRHOID(S): CPT | Performed by: COLON & RECTAL SURGERY

## 2020-08-13 PROCEDURE — 3008F BODY MASS INDEX DOCD: CPT | Performed by: COLON & RECTAL SURGERY

## 2020-08-13 PROCEDURE — 3079F DIAST BP 80-89 MM HG: CPT | Performed by: COLON & RECTAL SURGERY

## 2020-08-13 RX ORDER — DIAZEPAM 5 MG/1
TABLET ORAL
COMMUNITY
Start: 2020-07-28 | End: 2020-08-28

## 2020-08-13 NOTE — PROGRESS NOTES
Follow Up Visit Note       Active Problems      1. Grade III hemorrhoids          Chief Complaint   Patient presents with:  Hemorrhoid Banding: EST PT - 1st Banding - Pt. states after Tuesday he seems to feel a bit better.  Pt. has been using his rectal sup needed for Sleep or Anxiety. , Disp: 90 tablet, Rfl: 2  •  Zolpidem Tartrate 10 MG Oral Tab, Take 0.5 tablets (5 mg total) by mouth nightly as needed for Sleep., Disp: 30 tablet, Rfl: 2  •  ergocalciferol 1.25 MG (47272 UT) Oral Cap, Take 1 capsule (50,000 underwent an uncomplicated application of a rubber band and injection of a 5% phenol solution into the base of the rubberbanded hemorrhoid. The patient tolerated the procedure well.   The patient remained stable throughout the entire procedure within my

## 2020-08-13 NOTE — PROCEDURES
Pre op diagnosis: Internal Hemorrhoids    Post op diagnosis: Same    Procedure: Anoscopy with O-ring Rubber Band Ligation of Internal Hemorrhoids    Surgeon: Bonny Pena MD    History of present illness:    This patient has internal hemorrhoids that are s

## 2020-08-31 ENCOUNTER — OFFICE VISIT (OUTPATIENT)
Dept: SURGERY | Facility: CLINIC | Age: 35
End: 2020-08-31
Payer: COMMERCIAL

## 2020-08-31 VITALS
HEART RATE: 60 BPM | SYSTOLIC BLOOD PRESSURE: 127 MMHG | TEMPERATURE: 98 F | RESPIRATION RATE: 16 BRPM | DIASTOLIC BLOOD PRESSURE: 89 MMHG | WEIGHT: 162 LBS | BODY MASS INDEX: 23 KG/M2

## 2020-08-31 DIAGNOSIS — K64.2 GRADE III HEMORRHOIDS: ICD-10-CM

## 2020-08-31 DIAGNOSIS — K62.89 ANAL PAIN: ICD-10-CM

## 2020-08-31 DIAGNOSIS — R10.2 PERINEAL PAIN IN MALE: Primary | ICD-10-CM

## 2020-08-31 PROCEDURE — 3079F DIAST BP 80-89 MM HG: CPT | Performed by: PHYSICIAN ASSISTANT

## 2020-08-31 PROCEDURE — 99213 OFFICE O/P EST LOW 20 MIN: CPT | Performed by: PHYSICIAN ASSISTANT

## 2020-08-31 PROCEDURE — 3074F SYST BP LT 130 MM HG: CPT | Performed by: PHYSICIAN ASSISTANT

## 2020-08-31 NOTE — PROGRESS NOTES
Follow Up Visit Note       Active Problems      1. Perineal pain in male    2. Grade III hemorrhoids    3. Anal pain          Chief Complaint   Patient presents with:  Rectal Pain: est pt. Pt c/o perianal redness and pain.  Pt states 'went to a walk in clin Date   • HEMORRHOIDECTOMY Right 8/15/2014    Performed by Nellie Pelletier MD at [facility] MAIN OR   • TRANSECT PUDENDAL (PELVIC) NERVE Bilateral April 2008    Release of bilateral pudendal nerve       The family history and social history have been reviewed by me swallowing. Respiratory: Negative for apnea, cough, shortness of breath and wheezing. Cardiovascular: Negative for chest pain, palpitations and leg swelling.    Gastrointestinal: Negative for abdominal distention, abdominal pain, anal bleeding, blood Genitourinary:    Rectum normal.   Rectum:      No anal fissure or external hemorrhoid.       Genitourinary Comments: Clinical examination of the perineum reveals there to be slight erythema of the perineum, however there is no active inflammation, the sk

## 2020-08-31 NOTE — PATIENT INSTRUCTIONS
The patient presents today with a new onset perineal pain approximately 2 weeks ago. The patient is well-known to our services for a history of internal hemorrhoids and intestinal bleeding. He had a rubber band placed on August 13, 2020 by Dr. Mickael Schwab. from a trauma to the area. He should continue to monitor the area, however there does not appear to be any infection at this time. If he does develop any new or worsening symptoms he should contact our office.   Otherwise, I will see the patient on an as-

## 2021-01-19 PROBLEM — R79.89 LOW VITAMIN D LEVEL: Status: ACTIVE | Noted: 2021-01-19

## 2021-11-23 ENCOUNTER — OFFICE VISIT (OUTPATIENT)
Dept: SURGERY | Facility: CLINIC | Age: 36
End: 2021-11-23
Payer: COMMERCIAL

## 2021-11-23 VITALS — TEMPERATURE: 98 F | HEART RATE: 69 BPM | SYSTOLIC BLOOD PRESSURE: 135 MMHG | DIASTOLIC BLOOD PRESSURE: 87 MMHG

## 2021-11-23 DIAGNOSIS — K62.89 ANAL PAIN: ICD-10-CM

## 2021-11-23 DIAGNOSIS — K64.2 PROLAPSED INTERNAL HEMORRHOIDS, GRADE 3: Primary | ICD-10-CM

## 2021-11-23 PROCEDURE — 3075F SYST BP GE 130 - 139MM HG: CPT | Performed by: COLON & RECTAL SURGERY

## 2021-11-23 PROCEDURE — 3079F DIAST BP 80-89 MM HG: CPT | Performed by: COLON & RECTAL SURGERY

## 2021-11-23 PROCEDURE — 46221 LIGATION OF HEMORRHOID(S): CPT | Performed by: COLON & RECTAL SURGERY

## 2021-11-23 PROCEDURE — 99213 OFFICE O/P EST LOW 20 MIN: CPT | Performed by: COLON & RECTAL SURGERY

## 2021-11-23 NOTE — PROGRESS NOTES
Follow Up Visit Note       Active Problems      1. Prolapsed internal hemorrhoids, grade 3    2. Anal pain          Chief Complaint   Patient presents with:  Hemorrhoids: 1 YEAR CONTINUED CARE FOR HEMORRHOIDS.  SEEN BLOOD INS TOOL MONTH AGO, C/O RECTAL PAIN this note as detailed above    Zach Tyson MD FACS FAS[facility]    My total time spent with this patient on today's visit was 20 minutes.   This includes time in preparation, obtaining and reviewing history, performing the examination, counseling and educati mg total) by mouth daily. , Disp: 60 tablet, Rfl: 5  •  SUMAtriptan Succinate 100 MG Oral Tab, Take 1 tablet (100 mg total) by mouth daily. , Disp: 9 tablet, Rfl: 5  •  Vardenafil HCl 20 MG Oral Tab, TK 1 T PO D 1 HOUR BEFORE NEEDED, Disp: 6 tablet, Rfl: 5 banded at today's visit. This at the site with the patient was feeling his symptoms. No evidence of any proctitis active Crohn's disease. Neurological:      Mental Status: He is alert.           Assessment   Prolapsed internal hemorrhoids, grade 3  (prim internal hemorrhoid. At today's visit, the patient underwent an uncomplicated application of a rubber band and injection of a 5% phenol solution into the base of the rubberbanded hemorrhoid. The patient tolerated the procedure well.   The patient yoselin

## 2021-11-23 NOTE — PATIENT INSTRUCTIONS
The patient presents today for continued care and evaluation of his hemorrhoids. This patient is well-known to myself after undergoing an excisional hemorrhoidectomy in 2014.   He has had multiple rubber band treatments and injections into his hemorrhoid within my office. The patient was asked to recover in my office for a few minutes prior to leaving for home. The patient should refrain from extreme sports or athletic activity, including heavy lifting.     I will see the patient on an as-needed basis

## 2021-11-23 NOTE — PROCEDURES
Pre op diagnosis: Internal Hemorrhoids    Post op diagnosis: Same    Procedure: Anoscopy with O-ring Rubber Band Ligation of Internal Hemorrhoids    Surgeon: Delfino Pena MD    History of present illness:    This patient has internal hemorrhoids that are s

## 2022-04-07 ENCOUNTER — OFFICE VISIT (OUTPATIENT)
Dept: SURGERY | Facility: CLINIC | Age: 37
End: 2022-04-07
Payer: COMMERCIAL

## 2022-04-07 VITALS
DIASTOLIC BLOOD PRESSURE: 92 MMHG | WEIGHT: 160 LBS | BODY MASS INDEX: 22.9 KG/M2 | HEIGHT: 70 IN | TEMPERATURE: 98 F | HEART RATE: 74 BPM | SYSTOLIC BLOOD PRESSURE: 133 MMHG

## 2022-04-07 DIAGNOSIS — K64.2 GRADE III HEMORRHOIDS: Primary | ICD-10-CM

## 2022-04-07 PROCEDURE — 46221 LIGATION OF HEMORRHOID(S): CPT | Performed by: COLON & RECTAL SURGERY

## 2022-04-07 PROCEDURE — 3075F SYST BP GE 130 - 139MM HG: CPT | Performed by: COLON & RECTAL SURGERY

## 2022-04-07 PROCEDURE — 3008F BODY MASS INDEX DOCD: CPT | Performed by: COLON & RECTAL SURGERY

## 2022-04-07 PROCEDURE — 3080F DIAST BP >= 90 MM HG: CPT | Performed by: COLON & RECTAL SURGERY

## 2022-04-07 NOTE — PATIENT INSTRUCTIONS
This patient presents for further care and treatment of his internal hemorrhoids. He is a . He flew to [place] and stayed overnight. He had episodes of diarrhea. This left him with pressure on the right side of the anus. He does not describe any prolapse or thrombosis. He states he does have some pink-tinged mucus drainage with wiping. He has no fever or chills. Has no abdominal pain. He states the diarrhea is getting better. At today's office visit we treated a right lateral internal hemorrhoid grade 3. At today's visit, the patient underwent an uncomplicated application of a rubber band and injection of a 5% phenol solution into the base of the rubberbanded hemorrhoid. The patient tolerated the procedure well. The patient remained stable throughout the entire procedure within my office. The patient was asked to recover in my office for a few minutes prior to leaving for home. The patient should refrain from extreme sports or athletic activity, including heavy lifting. This completes this patient's rubber band treatments to their internal hemorrhoids. I have no further follow-up scheduled with this patient at this time. This patient can see me on an as-needed basis. This patient should return urgently for any problems or complications related to my surgical intervention.

## 2022-04-07 NOTE — PROCEDURES
Pre op diagnosis: Internal Hemorrhoids    Post op diagnosis: Same    Procedure: Anoscopy with O-ring Rubber Band Ligation of Internal Hemorrhoids    Surgeon: Vipul Anand MD    History of present illness: This patient has internal hemorrhoids that are symptomatic    Operative findings: The internal hemorrhoid was successfully ligated in the standard fashion with an O-ring ligator. Operative summary:  The patient was draped and exposed in the usual fashion. A medical assistant was present at all times. External visualization of the perineum and gluteal cleft was performed. Digital exam was performed with a well lubricated examining finger. Diagnostic Anoscopy was performed with lubrication. The anal canal was well visualized. An internal hemorrhoid was identified and well visualized. The internal hemorrhoid was grasped well above the dentate line with the grasper. The internal hemorrhoid was pulled within the O-ring ligator. The O-ring ligator was fired without complication. A 5% phenol solution was injected into the hemorrhoid and at its base. The patient tolerated the procedure and was observed in our office for at least 5 minutes prior to discharge. All findings are listed in the physical exam section of this note.

## 2022-05-18 NOTE — PROCEDURES
Procedure:  Anoscopy    Surgeon: Sandra Joy    Anesthesia: None    Findings: See the progress note attached for all findings    Operative Summary: The patient was placed in a prone position on the proctoscopy table, the hips were flexed in the jackknife p Prednisone Pregnancy And Lactation Text: This medication is Pregnancy Category C and it isn't know if it is safe during pregnancy. This medication is excreted in breast milk.

## (undated) NOTE — LETTER
18    Patient: Venkat Silva  : 1985 Visit date: 3/8/2018    Dear  Dr. Anna Ricci MD,    Thank you for referring Venkat Silva to my practice. Please find my assessment and plan below.         Assessment   Prolapsed internal hemorrho

## (undated) NOTE — LETTER
10/13/19    Patient: Stephani Mark  : 1985 Visit date: 10/10/2019    Dear  Dr. Vikas Posada MD,    Thank you for referring Stephanikailee Mark to my practice. Please find my assessment and plan below.         Assessment   Intestinal bleeding  (david Sumeet Hammonds MD   CC: No Recipients

## (undated) NOTE — LETTER
19    Patient: Annie Silver  : 1985 Visit date: 3/21/2019    Dear  Dr. Hai Sharp MD,    Thank you for referring Annie Silver to my practice. Please find my assessment and plan below.          Assessment   Anal pain  (primary encou have encouraged him to obtain Anusol hydrocortisone suppositories which should be available over-the-counter. The patient may use these for any refractory hemorrhoidal symptoms that do not improve while he is traveling.     I have no further appointments s

## (undated) NOTE — LETTER
10/20/18    Patient: Phillip Smallwood  : 1985 Visit date: 10/18/2018    Dear  Dr. Ankit Alejandro MD,    Thank you for referring Phillip Cl to my practice. Please find my assessment and plan below.                Assessment   Anal pain  (primar

## (undated) NOTE — LETTER
18    Patient: Vee Castillo  : 1985 Visit date: 2018    Dear  Dr. Nelson Siddiqui MD,    Thank you for referring Vee Castillo to my practice. Please find my assessment and plan below.       Assessment   Internal hemorrhoids  (prima minimal aid. He is also currently performing sitz baths. The patient has never had a colonoscopy. The patient's past history consists of a pudendal nerve decompression and transposition.   This was due to severe coccygeal pain that he experienced after

## (undated) NOTE — LETTER
17    Patient: Edmond Harrison  : 1985 Visit date: 2017    Dear  Dr. Jeffry Mckinnon,    Thank you for referring Edmond Harrison to my practice. Please find my assessment and plan below.               Assessment   Anal pain  (primary encou

## (undated) NOTE — LETTER
20    Patient: Jodi Barksdale  : 1985 Visit date: 2020    Dear  Dr. Champ Peña MD,    Thank you for referring Jodi Apolonia to my practice. Please find my assessment and plan below.         Assessment   Grade III hemorrhoids  (pr

## (undated) NOTE — LETTER
22    Patient: Davina Del Valle  : 1985 Visit date: 2022    Dear  Daly Coronado MD    Thank you for referring Davina Del Valle to my practice. Please find my assessment and plan below. Assessment   Grade III hemorrhoids  (primary encounter diagnosis)    Plan   This patient presents for further care and treatment of his internal hemorrhoids. He is a . He flew to [place] and stayed overnight. He had episodes of diarrhea. This left him with pressure on the right side of the anus. He does not describe any prolapse or thrombosis. He states he does have some pink-tinged mucus drainage with wiping. He has no fever or chills. Has no abdominal pain. He states the diarrhea is getting better. At today's office visit we treated a right lateral internal hemorrhoid grade 3. At today's visit, the patient underwent an uncomplicated application of a rubber band and injection of a 5% phenol solution into the base of the rubberbanded hemorrhoid. The patient tolerated the procedure well. The patient remained stable throughout the entire procedure within my office. The patient was asked to recover in my office for a few minutes prior to leaving for home. The patient should refrain from extreme sports or athletic activity, including heavy lifting. This completes this patient's rubber band treatments to their internal hemorrhoids. I have no further follow-up scheduled with this patient at this time. This patient can see me on an as-needed basis. This patient should return urgently for any problems or complications related to my surgical intervention.            Sincerely,       Noris Gomez MD   CC: No Recipients

## (undated) NOTE — LETTER
18    Patient: Zainab Hunter  : 1985 Visit date: 3/22/2018    Dear  Dr. Carolyn Buckley MD,    Thank you for referring Zainab Hunter to my practice. Please find my assessment and plan below.       Assessment   Prolapsed internal hemorrhoi

## (undated) NOTE — LETTER
18    Patient: Cookie Ward  : 1985 Visit date: 2018    Dear  Dr. Kristin Rowan MD,    Thank you for referring Cookie Ward to my practice. Please find my assessment and plan below.       Assessment   Prolapsed internal hemorrhoi

## (undated) NOTE — LETTER
20    Patient: Brionna Arellano  : 1985 Visit date: 2020    Dear  Dr. Theresa Acuna MD,    Thank you for referring Brionna Arellano to my practice. Please find my assessment and plan below.         Assessment   Anal pain  (primary encoun symptoms. He should continue the cortisone suppositories up until the time we placed the rubber band.              Sincerely,       Víctor Toure MD   CC: No Recipients

## (undated) NOTE — LETTER
18    Patient: Ge Diaz  : 1985 Visit date: 2018    Dear  Dr. Tay Carrera MD,    Thank you for referring Aprilale Joe to my practice. Please find my assessment and plan below.       Assessment   Prolapsed internal hemorrhoid

## (undated) NOTE — LETTER
19    Patient: Cookie Ward  : 1985 Visit date: 3/21/2019    Dear  Dr. Kristin Rowan MD,    Thank you for referring Cookie Ward to my practice. Please find my assessment and plan below.          Assessment   Anal pain  (primary encou have encouraged him to obtain Anusol hydrocortisone suppositories which should be available over-the-counter. The patient may use these for any refractory hemorrhoidal symptoms that do not improve while he is traveling.     I have no further appointments s

## (undated) NOTE — LETTER
20    Patient: Barbie Turner  : 1985 Visit date: 2020    Dear  Dr. Wayne Aj MD,    Thank you for referring Barbie Turner to my practice. Please find my assessment and plan below.         Assessment   Thrombosed external hemorrh daily for his diarrhea. This appears to be the inciting factor for his hemorrhoids. I do not recommend any treatment of his hemorrhoids until we have his diarrhea under control.     I will see the patient on an as-needed basis for his hemorrhoids at this

## (undated) NOTE — LETTER
21    Patient: Annie Silver  : 1985 Visit date: 2021    Dear  Juju Lomas MD    Thank you for referring Annie Silver to my practice. Please find my assessment and plan below.     Assessment   Prolapsed internal hemorrho postero-lateral internal hemorrhoid. At today's visit, the patient underwent an uncomplicated application of a rubber band and injection of a 5% phenol solution into the base of the rubberbanded hemorrhoid. The patient tolerated the procedure well.

## (undated) NOTE — LETTER
19    Patient: Maru Palomo  : 1985 Visit date: 2019    Dear  Dr. Tang Erickson MD,    Thank you for referring Maru Palomo to my practice. Please find my assessment and plan below.         Assessment   Anal pain  (primary encou of his hemorrhoids.              Sincerely,       Jimmie Beth MD   CC: No Recipients

## (undated) NOTE — LETTER
18    Patient: Zainab Hunter  : 1985 Visit date: 2018    Dear  Dr. Carolyn Buckley MD,    Thank you for referring Zainab Hunter to my practice. Please find my assessment and plan below.       Assessment   Anal pain  (primary encounte

## (undated) NOTE — LETTER
18    Patient: Hebert Luis  : 1985 Visit date: 2018    Dear  Dr. Noris Deleon MD,    Thank you for referring Hebert Luis to my practice. Please find my assessment and plan below.       Assessment   Prolapsed internal hemorrhoi